# Patient Record
Sex: MALE | Race: BLACK OR AFRICAN AMERICAN | NOT HISPANIC OR LATINO | URBAN - METROPOLITAN AREA
[De-identification: names, ages, dates, MRNs, and addresses within clinical notes are randomized per-mention and may not be internally consistent; named-entity substitution may affect disease eponyms.]

---
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
## 2021-11-19 PROBLEM — Z87.898 PERSONAL HISTORY OF OTHER SPECIFIED CONDITIONS: Chronic | Status: ACTIVE | Noted: 2019-08-31

## 2022-01-04 ENCOUNTER — APPOINTMENT (OUTPATIENT)
Dept: PEDIATRIC ALLERGY IMMUNOLOGY | Facility: CLINIC | Age: 22
End: 2022-01-04

## 2022-08-12 NOTE — PATIENT PROFILE PEDIATRIC. - FALLEN IN THE PAST
Patient asking to be added on to providers schedule for anxiety. Patient recently lost his father. Ok to add on ?   no

## 2023-09-20 NOTE — ED PROVIDER NOTE - ATTENDING CONTRIBUTION TO CARE
From: Eufemia Vee  To: Tika Briseno  Sent: 9/19/2023 6:49 PM CDT  Subject: Wellbutrin dose increase    I would like to try increasing my Wellbutrin to 450 mg    The resident's documentation has been prepared under my direction and personally reviewed by me in its entirety. I confirm that the note above accurately reflects all work, treatment, procedures, and medical decision making performed by me.

## 2024-06-10 NOTE — ED PEDIATRIC NURSE NOTE - GENITOURINARY WDL
Left patient a VM X1   
Pt said he got called in to work and will not be able to come in this morning.  
Bladder non-tender and non-distended. Urine clear yellow.

## 2024-10-31 ENCOUNTER — APPOINTMENT (OUTPATIENT)
Dept: PLASTIC SURGERY | Facility: CLINIC | Age: 24
End: 2024-10-31

## 2024-10-31 DIAGNOSIS — M79.644 PAIN IN RIGHT FINGER(S): ICD-10-CM

## 2024-10-31 DIAGNOSIS — M24.542 CONTRACTURE, LEFT HAND: ICD-10-CM

## 2024-10-31 PROCEDURE — 99204 OFFICE O/P NEW MOD 45 MIN: CPT

## 2024-12-05 ENCOUNTER — APPOINTMENT (OUTPATIENT)
Dept: PLASTIC SURGERY | Facility: CLINIC | Age: 24
End: 2024-12-05

## 2024-12-05 DIAGNOSIS — M24.542 CONTRACTURE, LEFT HAND: ICD-10-CM

## 2024-12-05 PROCEDURE — 99214 OFFICE O/P EST MOD 30 MIN: CPT
